# Patient Record
Sex: FEMALE | HISPANIC OR LATINO
[De-identification: names, ages, dates, MRNs, and addresses within clinical notes are randomized per-mention and may not be internally consistent; named-entity substitution may affect disease eponyms.]

---

## 2020-08-27 PROBLEM — Z00.00 ENCOUNTER FOR PREVENTIVE HEALTH EXAMINATION: Status: ACTIVE | Noted: 2020-08-27

## 2020-08-31 DIAGNOSIS — M54.5 LOW BACK PAIN: ICD-10-CM

## 2020-08-31 DIAGNOSIS — G89.29 LOW BACK PAIN: ICD-10-CM

## 2020-09-01 ENCOUNTER — APPOINTMENT (OUTPATIENT)
Dept: NEUROSURGERY | Facility: CLINIC | Age: 80
End: 2020-09-01

## 2020-09-24 ENCOUNTER — APPOINTMENT (OUTPATIENT)
Dept: NEUROSURGERY | Facility: CLINIC | Age: 80
End: 2020-09-24
Payer: MEDICARE

## 2020-09-24 VITALS
HEIGHT: 55 IN | HEART RATE: 78 BPM | BODY MASS INDEX: 34.02 KG/M2 | WEIGHT: 147 LBS | OXYGEN SATURATION: 100 % | SYSTOLIC BLOOD PRESSURE: 185 MMHG | TEMPERATURE: 97.6 F | DIASTOLIC BLOOD PRESSURE: 76 MMHG

## 2020-09-24 PROCEDURE — 99203 OFFICE O/P NEW LOW 30 MIN: CPT

## 2020-09-25 NOTE — PHYSICAL EXAM
[General Appearance - Alert] : alert [General Appearance - Well Nourished] : well nourished [FreeTextEntry1] : walks with a walker [Cranial Nerves Optic (II)] : visual acuity intact bilaterally,  pupils equal round and reactive to light [Cranial Nerves Oculomotor (III)] : extraocular motion intact [Cranial Nerves Trigeminal (V)] : facial sensation intact symmetrically [Cranial Nerves Facial (VII)] : face symmetrical [Cranial Nerves Vestibulocochlear (VIII)] : hearing was intact bilaterally [Cranial Nerves Glossopharyngeal (IX)] : tongue and palate midline [Cranial Nerves Accessory (XI - Cranial And Spinal)] : head turning and shoulder shrug symmetric [Cranial Nerves Hypoglossal (XII)] : there was no tongue deviation with protrusion [Motor Handedness Right-Handed] : the patient is right hand dominant [Dysesthesia] : no dysesthesia [Hyperesthesia] : no hyperesthesia [Limited Balance] : the patient's balance was impaired [2+] : Ankle jerk left 2+ [Straight-Leg Raise Test - Left] : straight leg raise of the left leg was positive [Straight-Leg Raise Test - Right] : straight leg raise of the right leg was positive

## 2020-09-25 NOTE — HISTORY OF PRESENT ILLNESS
[de-identified] : Ms. PELLETIER  is a 80 year year old F  who comes today for evaluation of her low back and buttocks pain] . The problem started several months ago after a fall.The severity is 8/10 . The [] is aggravated by moving.Nothing helps.There are associated symptoms including difficulty walking . Physical therapy has not helped and pain management has not been done The studies done include xray There  are long standing  bowel and  bladder incontinence.\par

## 2020-09-25 NOTE — PLAN
[FreeTextEntry1] : SHe has multiple loose screws and osteoporosis;.At this point I think she is too fragile for an operation. I will have her get pain management for the pain.

## 2025-01-28 ENCOUNTER — APPOINTMENT (OUTPATIENT)
Age: 85
End: 2025-01-28

## 2025-01-28 VITALS
DIASTOLIC BLOOD PRESSURE: 92 MMHG | OXYGEN SATURATION: 96 % | SYSTOLIC BLOOD PRESSURE: 175 MMHG | TEMPERATURE: 97.1 F | RESPIRATION RATE: 14 BRPM | HEART RATE: 76 BPM

## 2025-01-28 DIAGNOSIS — R93.89 ABNORMAL FINDINGS ON DIAGNOSTIC IMAGING OF OTHER SPECIFIED BODY STRUCTURES: ICD-10-CM

## 2025-01-28 PROCEDURE — 99203 OFFICE O/P NEW LOW 30 MIN: CPT
